# Patient Record
Sex: MALE | Race: BLACK OR AFRICAN AMERICAN | URBAN - METROPOLITAN AREA
[De-identification: names, ages, dates, MRNs, and addresses within clinical notes are randomized per-mention and may not be internally consistent; named-entity substitution may affect disease eponyms.]

---

## 2021-06-19 ENCOUNTER — HOSPITAL ENCOUNTER (EMERGENCY)
Age: 31
Discharge: HOME | End: 2021-06-19
Attending: EMERGENCY MEDICINE

## 2021-06-19 VITALS
DIASTOLIC BLOOD PRESSURE: 88 MMHG | HEART RATE: 116 BPM | SYSTOLIC BLOOD PRESSURE: 137 MMHG | TEMPERATURE: 97.1 F | RESPIRATION RATE: 18 BRPM | OXYGEN SATURATION: 97 %

## 2021-06-19 DIAGNOSIS — F10.920 ALCOHOLIC INTOXICATION WITHOUT COMPLICATION (HCC): Primary | ICD-10-CM

## 2021-06-19 DIAGNOSIS — F32.A DEPRESSION, UNSPECIFIED DEPRESSION TYPE: ICD-10-CM

## 2021-06-19 LAB
ALBUMIN SERPL-MCNC: 4.9 G/DL (ref 3.5–5)
ALBUMIN/GLOB SERPL: 1.3 {RATIO} (ref 1.1–2.2)
ALP SERPL-CCNC: 118 U/L (ref 45–117)
ALT SERPL-CCNC: 38 U/L (ref 12–78)
AMPHET UR QL SCN: NEGATIVE
ANION GAP SERPL CALC-SCNC: 10 MMOL/L (ref 5–15)
APAP SERPL-MCNC: <2 UG/ML (ref 10–30)
AST SERPL-CCNC: 38 U/L (ref 15–37)
BARBITURATES UR QL SCN: NEGATIVE
BASOPHILS # BLD: 0.1 K/UL (ref 0–0.1)
BASOPHILS NFR BLD: 1 % (ref 0–1)
BENZODIAZ UR QL: NEGATIVE
BILIRUB SERPL-MCNC: 0.3 MG/DL (ref 0.2–1)
BUN SERPL-MCNC: 17 MG/DL (ref 6–20)
BUN/CREAT SERPL: 15 (ref 12–20)
CALCIUM SERPL-MCNC: 9 MG/DL (ref 8.5–10.1)
CANNABINOIDS UR QL SCN: POSITIVE
CHLORIDE SERPL-SCNC: 106 MMOL/L (ref 97–108)
CO2 SERPL-SCNC: 21 MMOL/L (ref 21–32)
COCAINE UR QL SCN: NEGATIVE
CREAT SERPL-MCNC: 1.11 MG/DL (ref 0.7–1.3)
DIFFERENTIAL METHOD BLD: ABNORMAL
DRUG SCRN COMMENT,DRGCM: ABNORMAL
EOSINOPHIL # BLD: 0.1 K/UL (ref 0–0.4)
EOSINOPHIL NFR BLD: 1 % (ref 0–7)
ERYTHROCYTE [DISTWIDTH] IN BLOOD BY AUTOMATED COUNT: 15.2 % (ref 11.5–14.5)
ETHANOL SERPL-MCNC: 258 MG/DL
FLUAV RNA SPEC QL NAA+PROBE: NOT DETECTED
FLUBV RNA SPEC QL NAA+PROBE: NOT DETECTED
GLOBULIN SER CALC-MCNC: 3.7 G/DL (ref 2–4)
GLUCOSE SERPL-MCNC: 137 MG/DL (ref 65–100)
HCT VFR BLD AUTO: 52.5 % (ref 36.6–50.3)
HGB BLD-MCNC: 17.7 G/DL (ref 12.1–17)
IMM GRANULOCYTES # BLD AUTO: 0 K/UL (ref 0–0.04)
IMM GRANULOCYTES NFR BLD AUTO: 1 % (ref 0–0.5)
LYMPHOCYTES # BLD: 2.3 K/UL (ref 0.8–3.5)
LYMPHOCYTES NFR BLD: 29 % (ref 12–49)
MAGNESIUM SERPL-MCNC: 2.6 MG/DL (ref 1.6–2.4)
MCH RBC QN AUTO: 27.9 PG (ref 26–34)
MCHC RBC AUTO-ENTMCNC: 33.7 G/DL (ref 30–36.5)
MCV RBC AUTO: 82.8 FL (ref 80–99)
METHADONE UR QL: NEGATIVE
MONOCYTES # BLD: 0.4 K/UL (ref 0–1)
MONOCYTES NFR BLD: 5 % (ref 5–13)
NEUTS SEG # BLD: 5 K/UL (ref 1.8–8)
NEUTS SEG NFR BLD: 63 % (ref 32–75)
NRBC # BLD: 0 K/UL (ref 0–0.01)
NRBC BLD-RTO: 0 PER 100 WBC
OPIATES UR QL: NEGATIVE
PCP UR QL: NEGATIVE
PLATELET # BLD AUTO: 272 K/UL (ref 150–400)
PMV BLD AUTO: 9.9 FL (ref 8.9–12.9)
POTASSIUM SERPL-SCNC: 3.9 MMOL/L (ref 3.5–5.1)
PROT SERPL-MCNC: 8.6 G/DL (ref 6.4–8.2)
RBC # BLD AUTO: 6.34 M/UL (ref 4.1–5.7)
SALICYLATES SERPL-MCNC: <1.7 MG/DL (ref 2.8–20)
SARS-COV-2, COV2: NOT DETECTED
SODIUM SERPL-SCNC: 137 MMOL/L (ref 136–145)
WBC # BLD AUTO: 7.9 K/UL (ref 4.1–11.1)

## 2021-06-19 PROCEDURE — 83735 ASSAY OF MAGNESIUM: CPT

## 2021-06-19 PROCEDURE — 80179 DRUG ASSAY SALICYLATE: CPT

## 2021-06-19 PROCEDURE — 82077 ASSAY SPEC XCP UR&BREATH IA: CPT

## 2021-06-19 PROCEDURE — 36415 COLL VENOUS BLD VENIPUNCTURE: CPT

## 2021-06-19 PROCEDURE — 80307 DRUG TEST PRSMV CHEM ANLYZR: CPT

## 2021-06-19 PROCEDURE — 85025 COMPLETE CBC W/AUTO DIFF WBC: CPT

## 2021-06-19 PROCEDURE — 99283 EMERGENCY DEPT VISIT LOW MDM: CPT

## 2021-06-19 PROCEDURE — 80053 COMPREHEN METABOLIC PANEL: CPT

## 2021-06-19 PROCEDURE — 80143 DRUG ASSAY ACETAMINOPHEN: CPT

## 2021-06-19 PROCEDURE — 87636 SARSCOV2 & INF A&B AMP PRB: CPT

## 2021-06-19 RX ORDER — IBUPROFEN 200 MG
1 TABLET ORAL
Status: DISCONTINUED | OUTPATIENT
Start: 2021-06-19 | End: 2021-06-19 | Stop reason: HOSPADM

## 2021-06-19 NOTE — BSMART NOTE
Comprehensive Assessment Form Part 1 Section I - Disposition Axis I - Alcohol Intoxication, Use Disorder, Severe; Substance Induced Mood Disorder Axis II - Deferred Axis III - No past medical history on file. Axis IV - Probems related to primary support network The Medical Doctor to Psychiatrist conference was not completed. The Medical Doctor is in agreement with Psychiatrist disposition because of (reason) pt is refusing to participate in MSE on a VOL basis. The plan is to contact Chris CORBETT to remain at bedside during pt evaluation. The on-call Psychiatrist consulted was Dr. Hannah Cárdenas. The admitting Psychiatrist will be Dr. Hannah Cárdenas. The admitting Diagnosis is  Alcohol Intoxication; Substance Induced Mood Disorder The Payor source is not on file. Section II - Integrated Summary Summary:  Pt is a 32 yo SAAM who presents to the ED by [facility] and is left as a VOL pt after he was picked up from a hotel where he was staying with his parents. Per [facility], pt was drinking heavily in a bar last night, got into a verbal argument with his parents, and voiced to them that he wanted kill himself; so they called [facility] for help. Pt reports that he moved to [place] from [place] one week ago due to the fact that he \"could not stay in [place] any longer\". Pt reports that he has, \"been awake for one troublesome day\". Pt refuses to respond to inquiry of SI, stating, \"I refuse to be put into a cage. I refuse to voluntarily put myself into a cage. \" Pt reports that he feels confused, and he feels like \"there is stupid shit happening in my head and I don't want to try to figure it out. I just want to try to be as happy as possible. \" Pt states that he is not currently working and he came to [place] to be closer to family, \"cousins and grand cousins\". Pt states, \"I want to text my mom, 'f--ck you, you selfish prick. You got me into this situation. I mentioned that I need help and you are using it against me. \" Pt states, \"I would rather just be by myself about it. \" Pt reports that he drank \"3, double wisky's tonight\". Pt reports, \"I am pretty good with my wiskey's, and I realized that my parents were not OK with me doing that. As far as I am concerned I was just sitting at the bar, with my earphones in, and they were not OK with it. \"  Pt reports, \"this is not the first time this has happened, and there is no english word to get myself out of it. I just don't want to go to a [facility] hospital.\" Writer notifies Charge RN, who contacts Chris CORBETT who is unwilling to participate in MSE on a VOL basis. Officer Clara Ward, TIO, presents to the ED to assist in deescalating ptwho has become verbally aggressive toward this writer, calling her a \"bi--ch and stating that he is no longer willing to talk to her\". Officer Clara Ward remains at bedside with pt through this writer's assessment. Writer is able to collaborate with Officer Clara Ward in accomplishing assessment on a VOL basis. Pt reports that he has had, \"a difficult time with alcohol for eight years. 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 Pt reports that his mother confronted him on his alcohol abuse tonight and that is what provoked him to state that he was having thoughts of wanting to kill himself. Pt reports that he feels that he has not been able to live up to his parent's expectations of him and he has struggled to maintain sobriety. Pt reports that he has been in a vicious cycle of substance abuse and feeling like he is a disappointment to his parents. Pt reports that he stated to his mother that he would be better off killing himself and she called the police for help. Pt denies SI/Intent/Plan to this writer. Pt denies HI/AVH. This writer discusses pt's plan of care with his attending physician, Dr. Clemente Miranda, who is in agreement to discharge pt home to follow up with [facility] provided on an OP basis.  Officer Clara Ward states that he will provide pt with a ride back to the hotel where pt is staying with his parents. The patient has demonstrated mental capacity to provide informed consent. The information is given by the patient. The Chief Complaint is: \"I can't understand the reason behind it. \". The Precipitant Factors are Pt reports that his mother told him that he needed help. Previous Hospitalizations: Pt denies The patient has not previously been in restraints. Current Psychiatrist and/or  is: N/A Lethality Assessment: 
The potential for suicide noted by the following: current substance abuse . The potential for homicide is not noted. The patient has not been a perpetrator of sexual or physical abuse. There are not pending charges. The patient is felt to be at risk for self harm or harm to others. The attending nurse was advised to request a TDO assessment and the patient needs supervision. Section III - Psychosocial 
The patient's overall mood and attitude is agitated and beligirent. Feelings of helplessness and hopelessness are observed by pt's report, \"there are no english words to get me out of this situation\". Generalized anxiety is observed by \"pt's report, 'you guys are not putting me in a f--israel weird cage. \".  Panic is observed by pt's report, \"I am not going to answer questions about harming myself or others. You are not going to put me in a cage. \" Phobias are not observed. Obsessive compulsive tendencies are not observed. Section IV - Mental Status Exam 
The patient's appearance is unkempt. The patient's behavior is agitated and shows poor eye contact. The patient is oriented to time, place, person and situation. The patient's speech is loud. The patient's mood is angry, is hostile, is anxious and is frightened. The range of affect is labile. The patient's thought content demonstrates no evidence of impairment. The thought process shows no evidence of impairment.   The patient's perception shows no evidence of impairment. The patient's memory shows no evidence of impairment. The patient's appetite shows no evidence of impairment. The patient's sleep has evidence of insomnia. The patient's insight is blaming. The patient's judgement is psychologically impaired. Section V - Substance Abuse The patient is using substances. The patient is using alcohol for 1-5 years with last use on 06/19/21. The patient has experienced the following withdrawal symptoms: N/A. Section VI - Living Arrangements The patient is single. The patient lives with a parent. The patient has no children. The patient does not plan to return home upon discharge. The patient does not have legal issues pending. The patient's source of income comes from family. Pentecostalism and cultural practices have not been voiced at this time. The patient's greatest support comes from unknown; and this person will not be involved with the treatment. The patient has not been in an event described as horrible or outside the realm of ordinary life experience either currently or in the past. 
The patient has not been a victim of sexual/physical abuse. Section VII - Other Areas of Clinical Concern The highest grade achieved is unknown with the overall quality of school experience being described as not noted. The patient is currently unemployed and speaks [place] as a primary language. The patient has no communication impairments affecting communication. The patient's preference for learning can be described as: can read and write adequately. The patient's hearing is normal.  The patient's vision is normal. 
 
Marian Pena MA, [place], Licensed Resident in Counseling

## 2021-06-19 NOTE — ED PROVIDER NOTES
57-year-old male with past medical history significant for alcohol abuse, depression presents intoxicated accompanied by police for agitation and suicidal thoughts when fighting with his parents this evening. Patient reports his parents called the police because he made a statement that he would hurt himself. Patient reports he has no intention of hurting himself. Patient is intermittently agitated in the emergency department and requires multiple staff to help keep him calm. Denies drug use. Denies self injury tonight. Denies access to weapons. Denies history of suicidal acts. No past medical history on file. No past surgical history on file. No family history on file. Social History     Socioeconomic History    Marital status: Not on file     Spouse name: Not on file    Number of children: Not on file    Years of education: Not on file    Highest education level: Not on file   Occupational History    Not on file   Tobacco Use    Smoking status: Not on file   Substance and Sexual Activity    Alcohol use: Not on file    Drug use: Not on file    Sexual activity: Not on file   Other Topics Concern    Not on file   Social History Narrative    Not on file     Social Determinants of Health     Financial Resource Strain:     Difficulty of Paying Living Expenses:    Food Insecurity:     Worried About Running Out of Food in the Last Year:     [address] in the Last Year:    Transportation Needs:     Lack of Transportation (Medical):      Lack of Transportation (Non-Medical):    Physical Activity:     Days of Exercise per Week:     Minutes of Exercise per Session:    Stress:     Feeling of Stress :    Social Connections:     Frequency of Communication with Friends and Family:     Frequency of Social Gatherings with Friends and Family:     Attends Anabaptism Services:     Active Member of Clubs or Organizations:     Attends Club or Organization Meetings:     Marital Status:    Intimate Partner Violence:     Fear of Current or Ex-Partner:     Emotionally Abused:     Physically Abused:     Sexually Abused: ALLERGIES: Patient has no known allergies. Review of Systems   Constitutional: Negative for chills and fever. HENT: Negative for congestion, nosebleeds and sore throat. Eyes: Negative for pain and discharge. Respiratory: Negative for cough and shortness of breath. Cardiovascular: Negative for chest pain and palpitations. Gastrointestinal: Negative for abdominal pain, constipation, nausea and vomiting. Genitourinary: Negative for decreased urine volume, dysuria, flank pain and urgency. Musculoskeletal: Negative for gait problem and myalgias. Skin: Negative for rash and wound. Neurological: Negative for seizures and syncope. Hematological: Does not bruise/bleed easily. Psychiatric/Behavioral: Positive for agitation and behavioral problems. Negative for confusion, self-injury and suicidal ideas. The patient is nervous/anxious. Vitals:    06/19/21 0356   BP: 137/88   Pulse: (!) 116   Resp: 18   Temp: 97.1 °F (36.2 °C)   SpO2: 97%            Physical Exam  Vitals and nursing note reviewed. Constitutional:       Appearance: He is well-developed. HENT:      Head: Normocephalic and atraumatic. Eyes:      Pupils: Pupils are equal, round, and reactive to light. Cardiovascular:      Rate and Rhythm: Normal rate and regular rhythm. Heart sounds: Normal heart sounds. Pulmonary:      Effort: Pulmonary effort is normal. No respiratory distress. Breath sounds: Normal breath sounds. No wheezing. Abdominal:      General: Bowel sounds are normal.      Palpations: Abdomen is soft. Tenderness: There is no abdominal tenderness. There is no guarding or rebound. Musculoskeletal:         General: Normal range of motion. Cervical back: Normal range of motion and neck supple. Skin:     General: Skin is warm and dry. Neurological:      Mental Status: He is alert and oriented to person, place, and time. Psychiatric:         Mood and Affect: Affect is labile. Behavior: Behavior is agitated. MDM  Number of Diagnoses or Management Options  Alcoholic intoxication without complication ([facility] [address]  Depression, unspecified depression type  Diagnosis management comments: 55-year-old male presents after family called police for agitation and patient threatening with suicidal thoughts after fight with his parents. Patient reports that he has been drinking alcohol tonight. Denies any suicidal ideations currently. Patient reports he did mention harming himself when he was fighting with his parents and they were alarmed given his history of depression. Patient is agitated in the emergency department but easily redirected and cooperative. Clinical impression consistent with substance-induced behavioral disorder. Patient evaluated by Oscar Travis. No indication for inpatient psychiatric admission.     Patient Progress  Patient progress: improved         Procedures